# Patient Record
Sex: MALE | Race: WHITE | Employment: FULL TIME | URBAN - METROPOLITAN AREA
[De-identification: names, ages, dates, MRNs, and addresses within clinical notes are randomized per-mention and may not be internally consistent; named-entity substitution may affect disease eponyms.]

---

## 2019-07-15 ENCOUNTER — HOSPITAL ENCOUNTER (EMERGENCY)
Facility: CLINIC | Age: 44
Discharge: HOME | End: 2019-07-15
Attending: EMERGENCY MEDICINE | Admitting: EMERGENCY MEDICINE
Payer: COMMERCIAL

## 2019-07-15 VITALS
TEMPERATURE: 98.4 F | DIASTOLIC BLOOD PRESSURE: 77 MMHG | OXYGEN SATURATION: 97 % | SYSTOLIC BLOOD PRESSURE: 114 MMHG | HEART RATE: 72 BPM | RESPIRATION RATE: 16 BRPM

## 2019-07-15 DIAGNOSIS — T78.3XXA ANGIOEDEMA, INITIAL ENCOUNTER: ICD-10-CM

## 2019-07-15 PROCEDURE — 25000128 H RX IP 250 OP 636: Performed by: EMERGENCY MEDICINE

## 2019-07-15 PROCEDURE — 25000125 ZZHC RX 250: Performed by: EMERGENCY MEDICINE

## 2019-07-15 PROCEDURE — 96361 HYDRATE IV INFUSION ADD-ON: CPT

## 2019-07-15 PROCEDURE — 96374 THER/PROPH/DIAG INJ IV PUSH: CPT

## 2019-07-15 PROCEDURE — 99284 EMERGENCY DEPT VISIT MOD MDM: CPT | Mod: 25

## 2019-07-15 RX ORDER — EPINEPHRINE 0.3 MG/.3ML
0.3 INJECTION SUBCUTANEOUS
Qty: 2 EACH | Refills: 1 | Status: SHIPPED | OUTPATIENT
Start: 2019-07-15

## 2019-07-15 RX ORDER — PREDNISONE 50 MG/1
TABLET ORAL
Qty: 4 TABLET | Refills: 0 | Status: SHIPPED | OUTPATIENT
Start: 2019-07-15

## 2019-07-15 RX ADMIN — SODIUM CHLORIDE 1000 ML: 9 INJECTION, SOLUTION INTRAVENOUS at 09:02

## 2019-07-15 RX ADMIN — FAMOTIDINE 20 MG: 10 INJECTION, SOLUTION INTRAVENOUS at 09:02

## 2019-07-15 ASSESSMENT — ENCOUNTER SYMPTOMS
SHORTNESS OF BREATH: 0
TROUBLE SWALLOWING: 1
VOMITING: 0
NAUSEA: 0

## 2019-07-15 NOTE — ED AVS SNAPSHOT
[facility] Emergency Department  [address]  [facility] [address]  Phone:  [phone]  Fax:  [phone]                                    Dereck Barrientos   MRN: [id number]    Department:  [facility] Emergency Department   Date of Visit:  7/15/2019           After Visit Summary Signature Page    I have received my discharge instructions, and my questions have been answered. I have discussed any challenges I see with this plan with the nurse or doctor.    ..........................................................................................................................................  Patient/Patient Representative Signature      ..........................................................................................................................................  Patient Representative Print Name and Relationship to Patient    ..................................................               ................................................  Date                                   Time    ..........................................................................................................................................  Reviewed by Signature/Title    ...................................................              ..............................................  Date                                               Time          22EPIC Rev 08/18

## 2019-07-15 NOTE — ED PROVIDER NOTES
"  History     Chief Complaint:  Allergic Reaction    The history is provided by the patient.      Dereck Barrientos is a healthy 44 year old male who presents with a presumed allergic reaction. Five days ago Dereck had some swelling to his lips and cheek when he awoke. This self-resolved by the afternoon and he had no further issues. He had a similar instance 3 days ago. Last night he had some pruritis on he top of his feet and hands without rash. When he awoke 4 hours prior to arrival, he noticed his lips were swollen. He then became lightheaded and diaphoretic prompting his son to call 911. Dereck had a mild difficulty swallowing as he felt this throat was thight, such that he \"could barely talk\" but had no difficulty breathing. He felt better when they turned up the air conditioner awaiting paramedics who then administered Epi, Benadryl, and Solu-Medrol. He is feeling much better after these interventions.    Dereck had no rash, nausea, vomiting, shortness of breath, or tongue swelling. Prior to this week he has never had this issue. He denies new exposures of any kind including soaps, detergents, medications, or foods; however, he has suspicion it may be \"something I'm eating.\" There is no family history of hereditary angioedema.     Allergies:  No known drug allergies.       Medications:    Prilosec     Past Medical History:    Medical history reviewed. No pertinent medical history.      Past Surgical History:    Past surgical history reviewed. No pertinent past surgical history.     Family History:    Maternal Grandfather: MI  Denies family history of hereditary angioedema.     Social History:  The patient was accompanied to the ED by EMS. Son at bedside.  Smoking Status: Never Smoker  Smokeless Tobacco: Never Used  Alcohol Use: Negative   Drug Use: Negative  Marital Status:       Review of Systems   Constitutional: Positive for diaphoresis (resolved).   HENT: Positive for facial swelling and trouble " swallowing. Negative for drooling.    Respiratory: Negative for shortness of breath and wheezing.    Gastrointestinal: Negative for nausea and vomiting.   Skin: Negative for rash.   Neurological: Positive for light-headedness (resolved).   All other systems reviewed and are negative.    Physical Exam     Patient Vitals for the past 24 hrs:   BP Temp Pulse Resp SpO2   07/15/19 0900 114/77 -- 72 -- 97 %   07/15/19 0845 115/82 -- 75 -- 97 %   07/15/19 0830 112/73 -- 77 -- 96 %   07/15/19 0815 113/77 -- 80 -- 96 %   07/15/19 0800 125/75 -- 75 -- --   07/15/19 0756 114/83 98.4  F (36.9  C) 73 16 100 %      Physical Exam  General: Well-developed and well-nourished. Well appearing middle aged  man. Cooperative.  Head:  Atraumatic.  Eyes:  Conjunctivae, lids, and sclerae are normal.  ENT:    Normal nose. Moist mucous membranes.  No tongue or intraoral edema.  No sublingual edema. No submandibular edema.  No posterior oropharyngeal edema though view is limited by Mallampati 3.  Mild to moderate edema of the upper and lower lips without facial edema.  No periorbital edema.  Neck:  Supple. Normal range of motion.  CV:  Regular rate and rhythm. Normal heart sounds with no murmurs, rubs, or gallops detected.  Resp:  No respiratory distress. Clear to auscultation bilaterally without decreased breath sounds, wheezing, rales, or rhonchi.  GI:  Soft. Non-distended. Non-tender.    MS:  Normal ROM. No bilateral lower extremity edema.  Skin:  Warm. Non-diaphoretic. No pallor.  No urticaria or rash.  Neuro:  Awake. A&Ox3. Normal strength.  Psych: Normal mood and affect. Normal speech.  Vitals reviewed.    Emergency Department Course     Interventions:  Medications   famotidine (PEPCID) injection 20 mg (20 mg Intravenous Given 7/15/19 0902)   0.9% sodium chloride BOLUS (0 mLs Intravenous Stopped 7/15/19 1040)      Emergency Department Course:    0805 Nursing notes and vitals reviewed. I performed an exam of the patient as  documented above.     1011 I rechecked the patient. He has mild improvement but some swelling was still present. The patient was comfortable for plan for discharge.     1107 I was informed that patient had remaining questions prior to discharge of which all were answered. Prior to discharge, I personally reviewed the exam results with the patient and all questions were answered. The patient is amenable to plan.     Impression & Plan      Medical Decision Making:  Dereck is a 44 year old man who states he had some mild lip edema a couple of days last week and each time it resolved spontaneously.  He had some ill-defined pruritus last night though when he awoke this morning he had upper and lower lip swelling which progressed to include lightheadedness and diaphoresis and ultimately his call to paramedics who administered epinephrine, Solu-Medrol, and Benadryl.  With these interventions patient has had improvement already though he does continue have mild to moderate upper and lower lip edema.  He states his throat is somewhat irritated and he initially had some difficulty swallowing though this has resolved with the use of epinephrine.  He had no difficulty breathing, vomiting, or rash.  Patient does feel that this may be related to something he ate and he was given Pepcid.  However, he can identify no single allergen and denies any new foods, detergents, soaps, etc.  Notably, he is not on lisinopril.  Exact etiology of his angioedema is unclear to me.  Given it is isolated with only some pruritus last night and never any rash, wheezing, or vomiting this does not appear to be traditional allergic reaction.  However, this would be difficult to exclude given his presentation.  A separate etiologies such as a hereditary angioedema is to be considered.  However, treating as allergic reaction at this time is prudent, particularly as he has such a vast improvement with epinephrine.  He was monitored in the emergency  department for greater than 2 hours and had further improvement in his lip edema with only mild residual edema on final examination.  He had no difficulty breathing or swallowing and was amenable to plan for discharge. He will continue with 4 more days of prednisone and twice daily Pepcid while on this.  I also recommended he use Benadryl every 6 hours for the first day and then as needed and I have also provided him an EpiPen and discussed appropriate indications for its use.  I have recommended he follow-up with his primary care provider and also referred him to allergy as I feel he will likely benefit from further investigation.  Patient understands to try to identify an allergen and eliminate as possible though also understands a low threshold for return should he have recurrence of symptoms including, but not limited to, face, lip, or tongue edema and/or difficulty breathing or swallowing.  All questions answered.  Amenable to discharge.    Diagnosis:    ICD-10-CM    1. Angioedema, initial encounter T78.3XXA      Disposition:   The patient was discharged home.     Discharge Medications:     Review of your medicines      START taking      Dose / Directions   EPINEPHrine 0.3 MG/0.3ML injection 2-pack  Commonly known as:  EPIPEN/ADRENACLICK/or ANY BX GENERIC EQUIV      Dose:  0.3 mg  Inject 0.3 mLs (0.3 mg) into the muscle once as needed for anaphylaxis  Quantity:  2 each  Refills:  1     predniSONE 50 MG tablet  Commonly known as:  DELTASONE      Take 1 tablet by mouth daily for 4 days.  Quantity:  4 tablet  Refills:  0           Where to get your medicines      Some of these will need a paper prescription and others can be bought over the counter. Ask your nurse if you have questions.    Bring a paper prescription for each of these medications    EPINEPHrine 0.3 MG/0.3ML injection 2-pack    predniSONE 50 MG tablet       Scribe Disclosure:  I, Orla Severson, am serving as a scribe at 7:58 AM on 7/15/2019 to  document services personally performed by Yuly Gamboa MD based on my observations and the provider's statements to me.   [facility] EMERGENCY DEPARTMENT       Yuly Gamboa MD  07/19/19 1923

## 2019-07-15 NOTE — ED TRIAGE NOTES
Patient presents to the ED via ambulance with an allergic reaction. Patient states felt mildly itchy last night and this morning awoke with swollen lips. States throat felt like it was closing while driving and called 911. EMS administered 0.3 mg of epi., 50mg of benadryl and 125mg of solumedrol. Patient states symptoms are improving, although notes lips are still swollen and throat still feels irritated.

## 2019-07-19 ASSESSMENT — ENCOUNTER SYMPTOMS
DIAPHORESIS: 1
FACIAL SWELLING: 1
WHEEZING: 0
LIGHT-HEADEDNESS: 1

## 2019-08-02 ENCOUNTER — HOSPITAL ENCOUNTER (EMERGENCY)
Facility: CLINIC | Age: 44
Discharge: HOME | End: 2019-08-02
Attending: EMERGENCY MEDICINE | Admitting: EMERGENCY MEDICINE
Payer: COMMERCIAL

## 2019-08-02 VITALS
RESPIRATION RATE: 18 BRPM | SYSTOLIC BLOOD PRESSURE: 122 MMHG | DIASTOLIC BLOOD PRESSURE: 78 MMHG | HEART RATE: 75 BPM | TEMPERATURE: 97.4 F | OXYGEN SATURATION: 97 %

## 2019-08-02 DIAGNOSIS — T78.40XA ALLERGIC REACTION, INITIAL ENCOUNTER: ICD-10-CM

## 2019-08-02 DIAGNOSIS — T78.3XXA ANGIOEDEMA, INITIAL ENCOUNTER: ICD-10-CM

## 2019-08-02 PROCEDURE — 96361 HYDRATE IV INFUSION ADD-ON: CPT

## 2019-08-02 PROCEDURE — 25000125 ZZHC RX 250: Performed by: EMERGENCY MEDICINE

## 2019-08-02 PROCEDURE — 96375 TX/PRO/DX INJ NEW DRUG ADDON: CPT

## 2019-08-02 PROCEDURE — 25000128 H RX IP 250 OP 636

## 2019-08-02 PROCEDURE — 96372 THER/PROPH/DIAG INJ SC/IM: CPT

## 2019-08-02 PROCEDURE — 96374 THER/PROPH/DIAG INJ IV PUSH: CPT

## 2019-08-02 PROCEDURE — 25000128 H RX IP 250 OP 636: Performed by: EMERGENCY MEDICINE

## 2019-08-02 PROCEDURE — 99284 EMERGENCY DEPT VISIT MOD MDM: CPT | Mod: 25

## 2019-08-02 RX ORDER — PREDNISONE 20 MG/1
TABLET ORAL
Qty: 10 TABLET | Refills: 0 | Status: SHIPPED | OUTPATIENT
Start: 2019-08-02

## 2019-08-02 RX ORDER — DIPHENHYDRAMINE HYDROCHLORIDE 50 MG/ML
25 INJECTION INTRAMUSCULAR; INTRAVENOUS ONCE
Status: COMPLETED | OUTPATIENT
Start: 2019-08-02 | End: 2019-08-02

## 2019-08-02 RX ORDER — METHYLPREDNISOLONE SODIUM SUCCINATE 125 MG/2ML
125 INJECTION, POWDER, LYOPHILIZED, FOR SOLUTION INTRAMUSCULAR; INTRAVENOUS ONCE
Status: COMPLETED | OUTPATIENT
Start: 2019-08-02 | End: 2019-08-02

## 2019-08-02 RX ADMIN — SODIUM CHLORIDE 1000 ML: 9 INJECTION, SOLUTION INTRAVENOUS at 00:42

## 2019-08-02 RX ADMIN — DIPHENHYDRAMINE HYDROCHLORIDE 25 MG: 50 INJECTION, SOLUTION INTRAMUSCULAR; INTRAVENOUS at 00:38

## 2019-08-02 RX ADMIN — METHYLPREDNISOLONE SODIUM SUCCINATE 125 MG: 125 INJECTION, POWDER, FOR SOLUTION INTRAMUSCULAR; INTRAVENOUS at 00:43

## 2019-08-02 RX ADMIN — FAMOTIDINE 20 MG: 10 INJECTION, SOLUTION INTRAVENOUS at 00:46

## 2019-08-02 NOTE — ED AVS SNAPSHOT
[facility] Emergency Department  [address]  [facility] [address]  Phone:  [phone]  Fax:  [phone]                                    Dereck Barrientos   MRN: [id number]    Department:  [facility] Emergency Department   Date of Visit:  8/2/2019           After Visit Summary Signature Page    I have received my discharge instructions, and my questions have been answered. I have discussed any challenges I see with this plan with the nurse or doctor.    ..........................................................................................................................................  Patient/Patient Representative Signature      ..........................................................................................................................................  Patient Representative Print Name and Relationship to Patient    ..................................................               ................................................  Date                                   Time    ..........................................................................................................................................  Reviewed by Signature/Title    ...................................................              ..............................................  Date                                               Time          22EPIC Rev 08/18

## 2019-08-02 NOTE — ED NOTES
Patient resting, uvula continues to appear swollen and patient continues to feel throat fullness, however dryness in throat has improved.

## 2019-08-02 NOTE — DISCHARGE INSTRUCTIONS
Please take prednisone and benadryl for allergies and itching.  Return to the ED if worsening swelling, shortness of breath, throat itching/difficulty swallowing, worsening rash or other acute changes.  Watch for these signs.  Give yourself an epi pen if you have shortness of breath, throat tightness, significant lip swelling and call 911.      Discharge Instructions  Allergic Reaction    An allergic reaction can result in a rash, itching, swelling, watery eyes, or a runny nose. A serious reaction can cause swelling of your mouth or throat, or difficulty breathing (wheezing). The most serious allergy is called anaphylaxis, and can be life-threatening. Many allergies result in hives, also called urticaria.       An allergy happens when the body s natural defense system (immune system) overreacts to something. The thing that triggers your allergic reaction is called an allergen. The first time you are exposed to your allergen, you may not have any reaction, but the body makes a protein called an antibody. The antibody lets the body recognize and remember the allergen.  Every time you are exposed to your allergen you get more antibody and your reaction can be more severe.      Generally, every Emergency Department visit should have a follow-up clinic visit with either a primary or a specialty clinic/provider. Please follow-up as instructed by your emergency provider today.    Call 911 if you have:  Swelling of the lips, tongue or throat.  Hoarse voice, drooling or trouble breathing.  Chest pain or shortness of breath.  Fainting or unconsciousness.    What can I do to help myself?  If you know what caused your allergy, do not touch it, throw any of it away, and tell others not to have it around you. Wear a medical alert bracelet with a name of your allergen on it.  If you do not know what you are allergic to, keep a journal of everything that you are exposed to (foods, soaps, medicines, etc.). Take this with you when  you follow up with your primary provider or specialist (Allergist). This may help determine what is causing the allergic reaction.  Take any medicines that are prescribed.  Antihistamines can decrease rash or itching. You may use Benadryl  (diphenhydramine) for rash or itching according to package directions, or use a prescription antihistamine as recommended by your provider.  For significant allergic reactions, you may have been given a prescription for an epinephrine (adrenaline) auto injector. Carry this with you at all times! Use it if you are having any symptoms of anaphylaxis.  Do not be afraid to use it. Return to the Emergency Department if you use your auto injector, call 911 if it does not resolve the symptoms. It is only meant to buy time until you can get to the Emergency Department!  If you were given a prescription for medicine here today, be sure to read all of the information (including the package insert) that comes with your prescription.  This will include important information about the medicine, its side effects, and any warnings that you need to know about.  The pharmacist who fills the prescription can provide more information and answer questions you may have about the medicine.  If you have questions or concerns that the pharmacist cannot address, please call or return to the Emergency Department.   Remember that you can always come back to the Emergency Department if you are not able to see your regular provider in the amount of time listed above, if you get any new symptoms, or if there is anything that worries you.

## 2019-08-02 NOTE — ED NOTES
Patient stated that he still feels better. Respiration even and unlabored. Denies any swelling to throat or tongue swelling, no lip swelling noted. VS stable. Will continue to monitor.

## 2019-08-02 NOTE — ED PROVIDER NOTES
"  History     Chief Complaint:  Allergic Reaction    The history is provided by the patient.      Dereck Barrientos is a 44 year old male who presents with his mother for an allergic reaction. To note, patient has documented every thing he has eaten for the past three weeks and thinks he is allergic to some type of oil. He has had previous allergic reactions but does not know what is causing it. These episodes have been responsive to medications. A few hours ago, patient ate tortilla chips with guacamole and noticed lower lip swelling soon after with a feeling of a \"lump in my throat.\" He then took Zyrtec but no other medications. Patient's reaction has been stable for the past hour and a half but was concerned and wanted to get his symptoms under control, prompting him to the ED. Here, patient states his voice is also hoarse and his feet are itchy. No shortness of breath. No alcohol, stress drugs, or tobacco. To note, patient has seen an allergist and states that his allergy is not hereditary.     Allergies:  NKDA     Medications:    Epinephrine  Deltasone     Past Medical History:    The patient is not currently taking any prescribed medications.    Past Surgical History:    The patient does not have any pertinent past surgical history.    Family History:    No past pertinent family history.    Social History:  Negative for tobacco use.  Negative for alcohol use.  Negative for drug use.  Presents with his mother.   Marital Status:  Single.     Review of Systems   All other systems reviewed and are negative.      Physical Exam     Patient Vitals for the past 24 hrs:   BP Temp Temp src Pulse Heart Rate Resp SpO2   08/02/19 0500 122/78 -- -- 75 66 -- 97 %   08/02/19 0445 120/74 -- -- -- 68 -- 98 %   08/02/19 0315 124/82 -- -- -- 68 -- 98 %   08/02/19 0300 116/71 -- -- 68 67 -- 96 %   08/02/19 0250 -- -- -- -- 61 -- 96 %   08/02/19 0240 117/63 -- -- 63 64 -- 96 %   08/02/19 0150 123/77 -- -- -- 73 -- 98 %   08/02/19 0130 " 131/73 -- -- 79 78 -- 97 %   19 0115 127/76 -- -- 80 63 -- 100 %   19 0100 126/79 -- -- 79 68 -- 96 %   19 0045 126/87 -- -- 74 73 -- 98 %   19 0030 123/88 -- -- 82 -- -- 98 %   19 0007 (!) 144/99 97.4  F (36.3  C) Oral 93 93 18 99 %     Physical Exam  General: Resting on the bed.  Head: No obvious trauma to head.  Ears, Nose, Throat:  External ears normal.  Nose normal.  Mild posterior oropharyngeal erythema, uvula appears to be edematous and erythematous.  No trismus.  Soft submandibular area.  Eyes:  Conjunctivae clear.  Pupils are equal, round, and reactive.   Neck: Normal range of motion.  Neck supple.   CV: Regular rate and rhythm.  No murmurs.      Respiratory: Effort normal and breath sounds normal.  No wheezing or crackles.   Gastrointestinal: Soft.  No distension. There is no tenderness.    Neuro: Alert. Moving all extremities appropriately.  Normal speech.    Skin: Skin is warm and dry.  Mild erythema noted over the dorsum of the feet.    Emergency Department Course   Interventions:  0030 Epinephrine 0.3 intramuscular   0038 Benadryl 25 mg IV  0043 Solu-medrol 125 mg IV   0046 Pepcid 20 mg IV     Emergency Department Course:  0020 Nursing notes and vitals reviewed. I performed an exam of the patient as documented above.     The patient was placed on continuous pulse oximetry while here in the ED.      IV inserted. Medicine administered as documented above. Blood drawn.     0140 I rechecked the patient and discussed the results of his workup thus far. Patient stated he was feeling better.     Findings and plan explained to the Patient. Patient discharged home with instructions regarding supportive care, medications, and reasons to return. The importance of close follow-up was reviewed. The patient was prescribed Deltasone.     I personally reviewed and answered all related questions prior to discharge.     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  Vital signs are reassuring.  Broad differential was pursued but not limited to allergic reaction, angioedema, anaphylaxis, urticaria, hereditary angioedema, infectious etiology, etc.  Overall there is no known infectious process.  Patient is afebrile.  This appears to be most consistent with angioedema and severe allergic reaction.  Patient has itching noted.  He has significant angioedema on exam.  He was given epinephrine, Solu-Medrol, Pepcid, Benadryl.  He was observed for 4 hours with significant improvement in symptoms and near resolution of the angioedema on the mid uvula.  He reports no persistent itching, throat swelling etc.  The uvula does look mildly generous but he reports it feels normal therefore I assume that this is resolved or resolving.  Patient reports that he has been previously tested for hereditary angioedema and is negative.  There is no evidence of anaphylactic shock as his vital signs remained stable.  He was observed for 4 hours with no rebound.  Discussed the possibility of rebound anaphylaxis with patient he was encouraged to use the EpiPen see him at home and call 911.  He was given a prednisone burst as well.  Close follow-up with his allergist was advised.  Strict return precautions were discussed.  Critical Care time:  none    Diagnosis:    ICD-10-CM    1. Allergic reaction, initial encounter T78.40XA    2. Angioedema, initial encounter T78.3XXA      Disposition:  discharged to home    Discharge Medications:     Medication List      Modified    * predniSONE 50 MG tablet  Commonly known as:  DELTASONE  Take 1 tablet by mouth daily for 4 days.  What changed:  Another medication with the same name was added. Make sure you understand how and when to take each.     * predniSONE 20 MG tablet  Commonly known as:  DELTASONE  Take two tablets (= 40mg) each day for 5 (five) days  What changed:  You were already  taking a medication with the same name, and this prescription was added. Make sure you understand how and when to take each.          Scribe Disposition  I, Salome Gonzales, am serving as a scribe on 8/2/2019 at 12:29 AM to personally document services performed by Nancy Geiger MD based on my observations and the provider's statements to me.     Salome Gonzales  8/2/2019   [facility] EMERGENCY DEPARTMENT       Nancy Geiger MD  08/02/19 0916

## 2019-08-02 NOTE — ED NOTES
Sensation of fullness in throat is improving, patient's voice is less hoarse.  Uvula appears less swollen on exam. VSS.  Continue to monitor patient.

## 2019-08-02 NOTE — ED NOTES
Respiration even and unlabored. Patient stated that he feels better. VS stable. Will continue to monitor.

## 2019-08-02 NOTE — ED NOTES
Lip appears less swollen, patient states his throat feels slightly less swollen.  Family at bedside.

## 2020-08-26 NOTE — DISCHARGE INSTRUCTIONS
Take prednisone as instructed with first dose tomorrow.  Take Pepcid twice daily while you are on prednisone.  Benadryl every 6 hours for the first day then as needed.  Use EpiPen if you are having significant lip, face, or tongue swelling or if you have recurrence of lightheadedness, sweating, or any other severe symptoms.  Return to the emergency department if you have severe symptoms or any other concerns.  Otherwise, follow-up with your primary care provider and/or allergist.  If you can identify possible allergen, eliminate this.  
declines

## 2020-10-05 NOTE — ED TRIAGE NOTES
Ate tortilla chips around 5:30, then around 8:30 pm, patient stated that he developed swelling to lip and throat swelling with itchiness. ABCs intact.    jaw pain

## 2023-07-31 ENCOUNTER — HOSPITAL ENCOUNTER (EMERGENCY)
Facility: HOSPITAL | Age: 48
Discharge: HOME | End: 2023-07-31
Attending: PEDIATRICS | Admitting: PEDIATRICS
Payer: COMMERCIAL

## 2023-07-31 ENCOUNTER — APPOINTMENT (OUTPATIENT)
Dept: CT IMAGING | Facility: HOSPITAL | Age: 48
End: 2023-07-31
Payer: COMMERCIAL

## 2023-07-31 VITALS
OXYGEN SATURATION: 97 % | TEMPERATURE: 97.6 F | HEIGHT: 71 IN | RESPIRATION RATE: 18 BRPM | WEIGHT: 240 LBS | BODY MASS INDEX: 33.6 KG/M2 | SYSTOLIC BLOOD PRESSURE: 144 MMHG | HEART RATE: 76 BPM | DIASTOLIC BLOOD PRESSURE: 92 MMHG

## 2023-07-31 DIAGNOSIS — R42 DIZZINESS: Primary | ICD-10-CM

## 2023-07-31 PROCEDURE — 70450 CT HEAD/BRAIN W/O DYE: CPT

## 2023-07-31 PROCEDURE — 99284 EMERGENCY DEPT VISIT MOD MDM: CPT

## 2023-07-31 NOTE — FSED PROVIDER NOTE
Subjective   History of Present Illness  The patient is a 48-year-old male who presents with headache and dizziness that started last night. Patient reports when he woke up this morning, the headache was gone, but he was still dizzy, so he called into work this morning, when he woke back up, he felt better. Patient denies any nausea, vomiting, shortness of breath, chest pain, or fevers, reports he feels fine now.     History provided by:  Patient   used: No      Review of Systems   Neurological:  Positive for dizziness and headaches.     No past medical history on file.    No Known Allergies    No past surgical history on file.    No family history on file.    Social History     Socioeconomic History    Marital status: Single   Tobacco Use    Smoking status: Never   Substance and Sexual Activity    Alcohol use: Not Currently           Objective   Physical Exam  Vitals and nursing note reviewed.   Constitutional:       Appearance: Normal appearance.   HENT:      Head: Normocephalic.      Right Ear: Tympanic membrane normal.      Left Ear: Tympanic membrane normal.      Nose: Nose normal.      Mouth/Throat:      Mouth: Mucous membranes are moist.      Pharynx: Oropharynx is clear. Uvula midline.   Eyes:      Extraocular Movements: Extraocular movements intact.      Conjunctiva/sclera: Conjunctivae normal.      Pupils: Pupils are equal, round, and reactive to light.   Cardiovascular:      Rate and Rhythm: Normal rate and regular rhythm.      Pulses: Normal pulses.      Heart sounds: Normal heart sounds.   Pulmonary:      Effort: Pulmonary effort is normal.      Breath sounds: Normal breath sounds and air entry.   Abdominal:      Palpations: Abdomen is soft.   Musculoskeletal:         General: Normal range of motion.      Cervical back: Normal range of motion and neck supple.      Right lower leg: No edema.      Left lower leg: No edema.   Skin:     General: Skin is warm and dry.   Neurological:       General: No focal deficit present.      Mental Status: He is alert and oriented to person, place, and time.      GCS: GCS eye subscore is 4. GCS verbal subscore is 5. GCS motor subscore is 6.      Cranial Nerves: Cranial nerves 2-12 are intact.      Sensory: Sensation is intact.      Motor: Motor function is intact.      Coordination: Coordination is intact.      Gait: Gait is intact.   Psychiatric:         Mood and Affect: Mood normal.         Behavior: Behavior normal. Behavior is cooperative.       Procedures           ED Course                                           Medical Decision Making  The patient is a 48-year-old male who presents with headache and dizziness that started last night. Patient reports when he woke up this morning, the headache was gone, but he was still dizzy, so he called into work this morning, when he woke back up, he felt better. Patient denies any nausea, vomiting, shortness of breath, chest pain, or fevers, reports he feels fine now. Patient reports he does work outside in the heat.   This patient presents with dizziness, most consistent with a peripheral cause, likely BPPV. No history of recent infection so doubt vestibular neuritis. History not consistent with meniere's disease. No history of trauma. No red flag features for central vertigo to include gradual onset, vertical/bidirectional or non-fatigable nystagmus, focal neurologic findings on exam (including inability to ambulate, ataxia, dysmetria). Presentation not consistent with an acute CNS infection, vertebral basilar artery insufficiency, cerebellar hemorrhage or infarction, intracranial mass or bleed.    Ct head-unremarkable at this time. Patient denies any dizziness or headache on re-exam,     Problems Addressed:  Dizziness: self-limited or minor problem    Amount and/or Complexity of Data Reviewed  Radiology: ordered.    Risk  OTC drugs.        Final diagnoses:   Dizziness       ED Disposition  ED Disposition       ED  Disposition   Discharge    Condition   Stable    Comment   --               Gladys Gilbert, NADEEM  [address] [facility] [place] [address] [phone]    Schedule an appointment as soon as possible for a visit in 1 week           Medication List      No changes were made to your prescriptions during this visit.

## 2023-07-31 NOTE — DISCHARGE INSTRUCTIONS
Follow-up with primary care for further evaluation and treatment    Make sure you are drinking plenty of fluids especially water.    Tylenol/Motrin as needed for pain/fever    If you develop increase in headaches, and dizziness you need to be re-evaluated.

## 2023-07-31 NOTE — ED NOTES
Pt says he became dizzy last night with a slight headache. Also was dizzy this morning after waking.

## 2023-07-31 NOTE — Clinical Note
[facility] [place] [id number] [place] [address] [place] [address]  Phone: [phone]    Mj Donato was seen and treated in our emergency department on 7/31/2023.  He may return to work on 08/01/2023.         Thank you for choosing [facility].    Aleena Contreras APRN

## 2025-01-17 ENCOUNTER — APPOINTMENT (OUTPATIENT)
Dept: CT IMAGING | Facility: HOSPITAL | Age: 50
End: 2025-01-17
Payer: COMMERCIAL

## 2025-01-17 ENCOUNTER — HOSPITAL ENCOUNTER (EMERGENCY)
Facility: HOSPITAL | Age: 50
Discharge: HOME | End: 2025-01-17
Payer: COMMERCIAL

## 2025-01-17 VITALS
WEIGHT: 251.32 LBS | OXYGEN SATURATION: 94 % | TEMPERATURE: 97.6 F | HEART RATE: 63 BPM | RESPIRATION RATE: 18 BRPM | BODY MASS INDEX: 35.19 KG/M2 | HEIGHT: 71 IN | DIASTOLIC BLOOD PRESSURE: 57 MMHG | SYSTOLIC BLOOD PRESSURE: 92 MMHG

## 2025-01-17 DIAGNOSIS — R10.32 LEFT LOWER QUADRANT ABDOMINAL PAIN: Primary | ICD-10-CM

## 2025-01-17 DIAGNOSIS — R19.7 DIARRHEA, UNSPECIFIED TYPE: ICD-10-CM

## 2025-01-17 LAB
ALBUMIN SERPL-MCNC: 4.4 G/DL (ref 3.5–5.2)
ALBUMIN/GLOB SERPL: 2 G/DL
ALP SERPL-CCNC: 56 U/L (ref 39–117)
ALT SERPL W P-5'-P-CCNC: 15 U/L (ref 1–41)
ANION GAP SERPL CALCULATED.3IONS-SCNC: 8.9 MMOL/L (ref 5–15)
AST SERPL-CCNC: 20 U/L (ref 1–40)
BASOPHILS # BLD AUTO: 0.04 10*3/MM3 (ref 0–0.2)
BASOPHILS NFR BLD AUTO: 0.6 % (ref 0–1.5)
BILIRUB SERPL-MCNC: 0.3 MG/DL (ref 0–1.2)
BILIRUB UR QL STRIP: NEGATIVE
BUN SERPL-MCNC: 24 MG/DL (ref 6–20)
BUN/CREAT SERPL: 25.8 (ref 7–25)
CALCIUM SPEC-SCNC: 9 MG/DL (ref 8.6–10.5)
CHLORIDE SERPL-SCNC: 104 MMOL/L (ref 98–107)
CLARITY UR: CLEAR
CO2 SERPL-SCNC: 23.1 MMOL/L (ref 22–29)
COLOR UR: YELLOW
CREAT SERPL-MCNC: 0.93 MG/DL (ref 0.76–1.27)
DEPRECATED RDW RBC AUTO: 43.8 FL (ref 37–54)
EGFRCR SERPLBLD CKD-EPI 2021: 101.3 ML/MIN/1.73
EOSINOPHIL # BLD AUTO: 0.28 10*3/MM3 (ref 0–0.4)
EOSINOPHIL NFR BLD AUTO: 4.5 % (ref 0.3–6.2)
ERYTHROCYTE [DISTWIDTH] IN BLOOD BY AUTOMATED COUNT: 13.2 % (ref 12.3–15.4)
GLOBULIN UR ELPH-MCNC: 2.2 GM/DL
GLUCOSE SERPL-MCNC: 111 MG/DL (ref 65–99)
GLUCOSE UR STRIP-MCNC: NEGATIVE MG/DL
HCT VFR BLD AUTO: 42.7 % (ref 37.5–51)
HGB BLD-MCNC: 14.7 G/DL (ref 13–17.7)
HGB UR QL STRIP.AUTO: NEGATIVE
IMM GRANULOCYTES # BLD AUTO: 0.01 10*3/MM3 (ref 0–0.05)
IMM GRANULOCYTES NFR BLD AUTO: 0.2 % (ref 0–0.5)
KETONES UR QL STRIP: NEGATIVE
LEUKOCYTE ESTERASE UR QL STRIP.AUTO: NEGATIVE
LIPASE SERPL-CCNC: 37 U/L (ref 13–60)
LYMPHOCYTES # BLD AUTO: 1.29 10*3/MM3 (ref 0.7–3.1)
LYMPHOCYTES NFR BLD AUTO: 20.5 % (ref 19.6–45.3)
MCH RBC QN AUTO: 31.1 PG (ref 26.6–33)
MCHC RBC AUTO-ENTMCNC: 34.4 G/DL (ref 31.5–35.7)
MCV RBC AUTO: 90.3 FL (ref 79–97)
MONOCYTES # BLD AUTO: 0.57 10*3/MM3 (ref 0.1–0.9)
MONOCYTES NFR BLD AUTO: 9.1 % (ref 5–12)
NEUTROPHILS NFR BLD AUTO: 4.09 10*3/MM3 (ref 1.7–7)
NEUTROPHILS NFR BLD AUTO: 65.1 % (ref 42.7–76)
NITRITE UR QL STRIP: NEGATIVE
NRBC BLD AUTO-RTO: 0 /100 WBC (ref 0–0.2)
PH UR STRIP.AUTO: 5.5 [PH] (ref 5–8)
PLATELET # BLD AUTO: 231 10*3/MM3 (ref 140–450)
PMV BLD AUTO: 9.5 FL (ref 6–12)
POTASSIUM SERPL-SCNC: 4.4 MMOL/L (ref 3.5–5.2)
PROT SERPL-MCNC: 6.6 G/DL (ref 6–8.5)
PROT UR QL STRIP: NEGATIVE
RBC # BLD AUTO: 4.73 10*6/MM3 (ref 4.14–5.8)
SODIUM SERPL-SCNC: 136 MMOL/L (ref 136–145)
SP GR UR STRIP: 1.09 (ref 1–1.03)
UROBILINOGEN UR QL STRIP: ABNORMAL
WBC NRBC COR # BLD AUTO: 6.28 10*3/MM3 (ref 3.4–10.8)

## 2025-01-17 PROCEDURE — 85025 COMPLETE CBC W/AUTO DIFF WBC: CPT

## 2025-01-17 PROCEDURE — 25510000001 IOPAMIDOL PER 1 ML

## 2025-01-17 PROCEDURE — 83690 ASSAY OF LIPASE: CPT

## 2025-01-17 PROCEDURE — 74177 CT ABD & PELVIS W/CONTRAST: CPT

## 2025-01-17 PROCEDURE — 99285 EMERGENCY DEPT VISIT HI MDM: CPT

## 2025-01-17 PROCEDURE — 80053 COMPREHEN METABOLIC PANEL: CPT

## 2025-01-17 PROCEDURE — 81003 URINALYSIS AUTO W/O SCOPE: CPT

## 2025-01-17 RX ORDER — IOPAMIDOL 755 MG/ML
100 INJECTION, SOLUTION INTRAVASCULAR
Status: COMPLETED | OUTPATIENT
Start: 2025-01-17 | End: 2025-01-17

## 2025-01-17 RX ORDER — SODIUM CHLORIDE 0.9 % (FLUSH) 0.9 %
10 SYRINGE (ML) INJECTION AS NEEDED
Status: DISCONTINUED | OUTPATIENT
Start: 2025-01-17 | End: 2025-01-17 | Stop reason: HOSPADM

## 2025-01-17 RX ADMIN — IOPAMIDOL 100 ML: 755 INJECTION, SOLUTION INTRAVENOUS at 10:30

## 2025-01-17 NOTE — ED NOTES
Pt c/o lower abdominal pain that started on Wednesday, had diarrhea once yesterday. Denies any urinary issues or n/v.

## 2025-01-17 NOTE — Clinical Note
[facility] EMERGENCY DEPARTMENT  [address] [facility] [place] [address]  Phone: [phone]    Mj Donato was seen and treated in our emergency department on 1/17/2025.  He may return to work on 01/18/2025.         Thank you for choosing [facility].    Leora Arenas APRN

## 2025-01-17 NOTE — ED PROVIDER NOTES
Subjective   History of Present Illness  Chief complaint: Left lower quadrant pain for the past few days      Context: Patient is a 49-year-old male who presents to the ER with complaints of lower abdominal pain since Wednesday and 1 episode of diarrhea yesterday.  Patient denies any fever, chest pain, shortness of air or nausea/vomiting.  Patient denies any urinary symptoms, any pain, swelling or discoloration of testicles. or concern for STDs.    PCP: Colby Gilbert              Review of Systems   Constitutional:  Negative for fever.       No past medical history on file.    No Known Allergies    No past surgical history on file.    No family history on file.    Social History     Socioeconomic History    Marital status: Single   Tobacco Use    Smoking status: Never   Substance and Sexual Activity    Alcohol use: Not Currently           Objective   Physical Exam  Vitals and nursing note reviewed.   Constitutional:       Appearance: He is well-developed.   HENT:      Head: Normocephalic.      Mouth/Throat:      Mouth: Mucous membranes are moist.   Eyes:      Extraocular Movements: Extraocular movements intact.   Cardiovascular:      Rate and Rhythm: Normal rate and regular rhythm.      Heart sounds: Normal heart sounds.   Pulmonary:      Effort: Pulmonary effort is normal.      Breath sounds: Normal breath sounds. No wheezing.   Abdominal:      Palpations: Abdomen is soft. There is no fluid wave, hepatomegaly, splenomegaly or mass.      Tenderness: There is abdominal tenderness in the left lower quadrant. There is no right CVA tenderness or left CVA tenderness. Negative signs include Zaragoza's sign, Rovsing's sign and McBurney's sign.   Skin:     General: Skin is warm and dry.      Capillary Refill: Capillary refill takes less than 2 seconds.   Neurological:      General: No focal deficit present.      Mental Status: He is alert and oriented to person, place, and time.   Psychiatric:         Mood and Affect: Mood  "normal.         Behavior: Behavior normal.         Procedures           ED Course           BP 92/57 (BP Location: Right arm, Patient Position: Lying)   Pulse 63   Temp 97.6 °F (36.4 °C) (Oral)   Resp 18   Ht 180.3 cm (71\")   Wt 114 kg (251 lb 5.2 oz)   SpO2 94%   BMI 35.05 kg/m²   Labs Reviewed   COMPREHENSIVE METABOLIC PANEL - Abnormal; Notable for the following components:       Result Value    Glucose 111 (*)     BUN 24 (*)     BUN/Creatinine Ratio 25.8 (*)     All other components within normal limits    Narrative:     GFR Categories in Chronic Kidney Disease (CKD)      GFR Category          GFR (mL/min/1.73)    Interpretation  G1                     90 or greater         Normal or high (1)  G2                      60-89                Mild decrease (1)  G3a                   45-59                Mild to moderate decrease  G3b                   30-44                Moderate to severe decrease  G4                    15-29                Severe decrease  G5                    14 or less           Kidney failure          (1)In the absence of evidence of kidney disease, neither GFR category G1 or G2 fulfill the criteria for CKD.    eGFR calculation 2021 CKD-EPI creatinine equation, which does not include race as a factor   URINALYSIS W/ MICROSCOPIC IF INDICATED (NO CULTURE) - Abnormal; Notable for the following components:    Specific Gravity, UA 1.092 (*)     All other components within normal limits    Narrative:     Urine microscopic not indicated.   LIPASE - Normal   CBC WITH AUTO DIFFERENTIAL - Normal   CBC AND DIFFERENTIAL    Narrative:     The following orders were created for panel order CBC & Differential.  Procedure                               Abnormality         Status                     ---------                               -----------         ------                     CBC Auto Differential[[id number]]        Normal              Final result                 Please view results for these " tests on the individual orders.     Medications   sodium chloride 0.9 % flush 10 mL (has no administration in time range)   iopamidol (ISOVUE-370) 76 % injection 100 mL (100 mL Intravenous Given 1/17/25 1030)     CT Abdomen Pelvis With Contrast    Result Date: 1/17/2025  Impression: No acute findings within the abdomen or pelvis. Electronically Signed: Micaela Obrien MD  1/17/2025 10:34 AM EST  Workstation ID: [id number]                                               Medical Decision Making  Radiology interpretation: CT abdomen pelvis reviewed and interpreted by [place]: No acute findings within the abdomen or pelvis  Further interpretation by radiologist as above    Lab interpretation:  Labs all viewed by me and significant for: BUN 24, creatinine 0.93, sodium 136, potassium 4.4, ALT 15, AST 20, white count 6.28, hemoglobin 14.7, lipase 37.  UA-specific gravity 1.092, negative ketones, negative blood, negative leuks, negative nitrite.    EKG was considered but was not emergently warranted at this time.    IV was established and labs and scans were obtained to evaluate for infection, electrolyte imbalance, colitis, diverticulitis, appendicitis, peritonitis.  Patient is a 49-year-old male who presents to the ER for above complaint.  On initial examination patient was resting comfortably in the bed, nontoxic appearance with no signs and symptoms of distress.  Physical examination was positive for tenderness to the left lower quadrant, that does not radiate to the right lower quadrant.  Patient has no current complaints, no medications were given at this time.  On reexamination patient was resting comfortably in the bed, nontoxic in appearance with no signs and symptoms of distress.  Patient has been hemodynamically stable and well-appearing during this ER visit.  Discussed findings and decision to discharge.  Advised patient to follow-up with [facility], and to call today to schedule an appointment.  Rest drink plenty of fluids,  patient connection was also provided to the patient to establish a primary care provider and was advised to call today to schedule an appointment.  Advised patient to return to the ER for any new or worsening symptoms.  Patient verbalized understanding of all discharge instructions.        Appropriate PPE worn during exam.    i discussed findings with patient who voices understanding of discharge instructions, signs and symptoms requiring return to ED; discharged improved and in stable condition with follow up for re-evaluation.  This document is intended for medical expert use only. Reading of this document by patients and/or patient's family without participating medical staff guidance may result in misinterpretation and unintended morbidity.  Any interpretation of such data is the responsibility of the patient and/or family member responsible for the patient in concert with their primary or specialist providers, not to be left for sources of online searches such as [employer], [employer] or similar queries. Relying on these approaches to knowledge may result in misinterpretation, misguided goals of care and even death should patients or family members try recommendations outside of the realm of professional medical care in a supervised inpatient environment.         Problems Addressed:  Diarrhea, unspecified type: complicated acute illness or injury  Left lower quadrant abdominal pain: complicated acute illness or injury    Amount and/or Complexity of Data Reviewed  Labs: ordered.  Radiology: ordered.    Risk  Prescription drug management.        Final diagnoses:   Left lower quadrant abdominal pain   Diarrhea, unspecified type       ED Disposition  ED Disposition       ED Disposition   Discharge    Condition   Stable    Comment   --               [facility] [place] [address] [facility] [address] [phone]  Schedule an appointment as soon as possible for a visit   Call today to  schedule a follow-up visit.    [facility] [place] [address]  [phone]  Schedule an appointment as soon as possible for a visit   Call today to establish a primary care provider.         Medication List      No changes were made to your prescriptions during this visit.            Leora Arenas, APRN  01/17/25 1247

## 2025-01-17 NOTE — DISCHARGE INSTRUCTIONS
Rest.  Drink plenty of fluids.  Follow-up with GI, call today to schedule an appointment.  Patient connection has been provided to you to establish a primary care provider, call today to schedule an appointment.  Return to the ER for any new or worsening symptoms.